# Patient Record
(demographics unavailable — no encounter records)

---

## 2024-11-22 NOTE — REASON FOR VISIT
[Follow Up] : a follow up visit [Family Member] : family member [Patient] : patient [Mother] : mother [FreeTextEntry1] : s/p posterior spinal fusion with instrumentation on 7/8/2022

## 2024-11-22 NOTE — HISTORY OF PRESENT ILLNESS
[FreeTextEntry1] : Jacy is a 15-year-old female, otherwise healthy underwent posterior spinal fusion with instrumentation on 7/8/2022 for scoliosis. She is 2 years out from surgery. Patient returns today with back pain. She feels the rods "pulling apart" in her back. Patient is not particularly active. She takes Motrin with help. As per mother, child recently sustained a toe fracture. Mother also reports issues with receiving at home instruction. It was not approved since the summer. She is interested in another request for home instruction. Mother is concerned regarding child's mental health. Mother feels child is depressed and would like assistance with switching schools. Mother also suspects child is on the autism spectrum and would like a referral. She denies fever, chills, incisional drainage. She denies extremity numbness, tingling, weakness, bowel or bladder dysfunction. She is resuming daily activities. Current symptoms include no chills, no fever, no nausea and no vomiting. Here today for routine postoperative visit.

## 2024-11-22 NOTE — ASSESSMENT
[FreeTextEntry1] : Jacy is a 15 yo F who underwent posterior spinal fusion with instrumentation on 7/8/2022 for scoliosis.   Today's assessment was performed with the assistance of the patient's grandparent as an independent historian as the patients history is unreliable. Clinical exam and previous postoperative imaging reviewed with patient and family at length. For patient's back pain, I am recommending the patient begin attending physical therapy sessions for back and core strengthening exercises; a new prescription was provided to family today. As for patient's mental and behavioral health, I recommended mother to reach out to my  for possible referrals to behavior health specialists. Home instruction request forms were filled out today. Activities as tolerated. Follow-up in 6 months has been recommended with AP and lateral spine x-ray at that time. All questions answered, understanding verbalized. Mother and patient in agreement with plan of care.  Discussed care for scar and need to keep it covered from sun exposure. Discussed activity limitations and modifications including collision and contact sports limitations. Discussed the natural history of spine fusion and time for healing. Possibility of late onset infection, nonunion, implant failure, extension of fusion, junctional arthritis, junctional kyphosis, need for implant exchange and removal and extension of fusion explained.. Patient to continue using vitamin E cream on scar. Discussed need for shoulder/back strengthening. Discussed exercises.   I, Kylah Raya, have acted as a scribe and documented the above information for Dr. Benavides on 11/18/2024.   We spent 30 minutes on HPI, Clinical exam, ordering/ reviewing all imaging, reviewing any existing record, reviewing findings and counseling patient to treatment, differentials,etiology, prognosis, natural history, implications on ADLs, activities limitations/modifications, genetics, answering questions and addressing concerns, treatment goals and documenting in the EHR.

## 2024-11-22 NOTE — REVIEW OF SYSTEMS
[Back Pain] : ~T back pain [NI] : Endocrine [Nl] : Hematologic/Lymphatic [Fever Above 102] : no fever [Nosebleeds] : no epistaxis [Wheezing] : no wheezing [Cough] : no cough [Shortness of Breath] : no shortness of breath

## 2024-11-22 NOTE — PHYSICAL EXAM
[FreeTextEntry1] : General: Patient is awake and alert and in no acute distress, Well developed, well nourished, cooperative, able to get on and off the bed with ease. \par  Skin: The skin is intact, warm, pink, and dry over the area examined.\par  Eyes: normal tinted sclera, normal eyelids and pupils were equal and round.\par  ENT: normal ears, normal nose, and normal lips. \par  Cardiovascular: There is brisk capillary refill in the digits of the affected extremity. They are symmetric pulses in the bilateral upper and lower extremities, positive peripheral pulses, brisk capillary refill, but no peripheral edema.\par  Respiratory: The patient is in no apparent respiratory distress. They are taking full deep breaths without use of accessory muscles or evidence of audible wheezes or stridor without the use of a stethoscope, normal respiratory effort.\par  Neurological: 5/5 motor strength in the main muscle groups of bilateral lower extremities, sensory intact in bilateral lower extremities.  \par  \par  Musculoskeletal:       \par  Child presents to my office ambulating independently. She is able to climb onto exam table and do sit without difficulty or pain. Examination of back reveals level shoulders and pelvis. Patient appears well-balanced. Well-healed midline spine incision without large dark discoloration in lower back. No drainage, no erythema, no edema. No fluctuance. Calves are soft, nontender bilaterally. Toes are warm and pink and moving freely. Brisk capillary refill. Sensation grossly intact distally. Observed jumping and squatting gradually tentatively. She is able to twist and bend tentatively. The surgical incision site(s) was clean, dry and intact, healed, not erythematous, absent of discharge, not swollen and not dehisced. Additional findings included an unremarkable neurological exam, peripheral vascular exam normal and maneuvers demonstrated a negative Remy's sign.

## 2024-11-22 NOTE — DATA REVIEWED
[de-identified] : Postoperative AP and lateral full spine scoliosis x-rays independently reviewed on 11/18/2024: Hardware in good position. Deformity correction achieved. Patient appears well balanced.

## 2024-11-22 NOTE — DATA REVIEWED
[de-identified] : Postoperative AP and lateral full spine scoliosis x-rays independently reviewed on 11/18/2024: Hardware in good position. Deformity correction achieved. Patient appears well balanced.